# Patient Record
Sex: MALE | Race: WHITE | Employment: FULL TIME | URBAN - METROPOLITAN AREA
[De-identification: names, ages, dates, MRNs, and addresses within clinical notes are randomized per-mention and may not be internally consistent; named-entity substitution may affect disease eponyms.]

---

## 2021-10-08 ENCOUNTER — OFFICE VISIT (OUTPATIENT)
Dept: FAMILY MEDICINE CLINIC | Age: 36
End: 2021-10-08
Payer: COMMERCIAL

## 2021-10-08 VITALS
HEIGHT: 74 IN | BODY MASS INDEX: 27.08 KG/M2 | SYSTOLIC BLOOD PRESSURE: 115 MMHG | HEART RATE: 81 BPM | WEIGHT: 211 LBS | TEMPERATURE: 98.2 F | DIASTOLIC BLOOD PRESSURE: 79 MMHG | RESPIRATION RATE: 18 BRPM | OXYGEN SATURATION: 98 %

## 2021-10-08 DIAGNOSIS — G89.29 CHRONIC BILATERAL LOW BACK PAIN WITHOUT SCIATICA: ICD-10-CM

## 2021-10-08 DIAGNOSIS — M21.619 BUNION: ICD-10-CM

## 2021-10-08 DIAGNOSIS — K58.0 IRRITABLE BOWEL SYNDROME WITH DIARRHEA: ICD-10-CM

## 2021-10-08 DIAGNOSIS — F41.9 ANXIETY: ICD-10-CM

## 2021-10-08 DIAGNOSIS — M54.50 CHRONIC BILATERAL LOW BACK PAIN WITHOUT SCIATICA: ICD-10-CM

## 2021-10-08 DIAGNOSIS — R00.2 PALPITATION: ICD-10-CM

## 2021-10-08 DIAGNOSIS — R55 SYNCOPE, UNSPECIFIED SYNCOPE TYPE: ICD-10-CM

## 2021-10-08 DIAGNOSIS — I49.9 IRREGULAR HEART RATE: Primary | ICD-10-CM

## 2021-10-08 PROCEDURE — 93000 ELECTROCARDIOGRAM COMPLETE: CPT | Performed by: FAMILY MEDICINE

## 2021-10-08 PROCEDURE — 99204 OFFICE O/P NEW MOD 45 MIN: CPT | Performed by: FAMILY MEDICINE

## 2021-10-08 RX ORDER — DICYCLOMINE HYDROCHLORIDE 10 MG/1
10 CAPSULE ORAL
Qty: 90 CAPSULE | Refills: 1 | Status: SHIPPED | OUTPATIENT
Start: 2021-10-08 | End: 2022-03-28 | Stop reason: SDUPTHER

## 2021-10-08 RX ORDER — SERTRALINE HYDROCHLORIDE 50 MG/1
50 TABLET, FILM COATED ORAL DAILY
Qty: 90 TABLET | Refills: 1 | Status: SHIPPED | OUTPATIENT
Start: 2021-10-08 | End: 2022-04-15 | Stop reason: SDUPTHER

## 2021-10-08 RX ORDER — PANTOPRAZOLE SODIUM 40 MG/1
40 TABLET, DELAYED RELEASE ORAL DAILY
Qty: 90 TABLET | Refills: 1 | Status: SHIPPED | OUTPATIENT
Start: 2021-10-08 | End: 2022-04-15 | Stop reason: SDUPTHER

## 2021-10-08 NOTE — PROGRESS NOTES
NAVYA Clark. is a 43 y.o. male who presents as a new patient. Has a lot of stuff on his mind. He primarily made this plan because of palpitations. He had an episode a few weeks ago where his heart pounded a few times while he was seated and then when he started paying attention to it he felt like his heart skipped a beat. He felt no chest pain lightheadedness or dizziness during the episode but does think he felt exhausted for an hour or 2 afterward. This is not a typical occurrence for him. He did tell me that he has had episodes of dizziness unrelated to palpitations. Since he was a teenager he recalls randomly blacking out with standing. This persisted through his time in the [employer]. He will still have an episode about once a year where he will stand up too quick and blackout and fall over a with very little warning. He had some arm fatigue with doing overhead work years ago and sounds like they were pursuing a work-up for thoracic outlet syndrome or subclavian steal but he did not complete the work-up. He has low back pain that he seen a chiropractor for but is never done any physical therapy. This bothers him on a daily basis. Points bilateral low back to the paraspinal muscles. Occasionally feels something shoot down the right leg. She has anxiety that is affecting his work and home life. Cites life stressors, working through child custody, stress at work. He has trouble focusing at work, easily distracted, gets stuck in a bed headspace. .  Mood has been a longstanding issue. The only thing is ever taken for it is Ativan. Took that daily and felt like it was not very effective. For 1 week out of every month he will have cramping loose stool. This will typically happen after meals. He will get cramping and then have to run to the bathroom. He will have a loose bowel movement and the cramping will get better.   On days where this is happening he will typically have 34 bowel movements. The other weeks out of the month are usually fine. His mother is getting worked up for her own bowel issues and he remembers community-acquired C. difficile coming up as a possibility. He thinks she would have been tested for this by now and has not heard anything since. Foot pain. Right great toe bunion. Joint is painful to the point where he is limping by the end of the day. He would like to see a foot doctor about this            PMHx:  No past medical history on file. Meds:   Current Outpatient Medications   Medication Sig Dispense Refill    sertraline (ZOLOFT) 50 mg tablet Take 1 Tablet by mouth daily. 90 Tablet 1    dicyclomine (BENTYL) 10 mg capsule Take 1 Capsule by mouth Before breakfast, lunch, and dinner. 90 Capsule 1       Allergies: Allergies   Allergen Reactions    Amoxicillin Unknown (comments)    Codeine Unknown (comments)       Smoker:  Social History     Tobacco Use   Smoking Status Former Smoker    Packs/day: 1.00    Years: 15.00    Pack years: 15.00    Types: Cigarettes    Quit date: 10/15/2019    Years since quittin.9   Smokeless Tobacco Former User    Types: [employer] Quit date: 10/7/2021       ETOH:   Social History     Substance and Sexual Activity   Alcohol Use Yes    Alcohol/week: 20.0 standard drinks    Types: 20 Cans of beer per week       FH: No family history on file. ROS:   As listed in HPI. In addition:  Constitutional:   No headache, fever, fatigue, weight loss or weight gain      Cardiac:    No chest pain      Resp:   No cough or shortness of breath      Neuro   No loss of consciousness, dizziness, seizures      Physical Exam:  Blood pressure 115/79, pulse 81, temperature 98.2 °F (36.8 °C), temperature source Skin, resp. rate 18, height 6' 2\" (1.88 m), weight 211 lb (95.7 kg), SpO2 98 %. GEN: No apparent distress. Alert and oriented and responds to all questions appropriately. NEUROLOGIC:  No focal neurologic deficits. Strength and sensation grossly intact. Coordination and gait grossly intact. EXT: Well perfused. No edema. SKIN: No obvious rashes. Lungs clear to auscultation bilaterally  CV regular rhythm no murmur  Low back examined. No midline tenderness, paraspinal muscle tenderness bilaterally. No postural muscle tenderness. Right great toe bunion. Pain is from the actual joint rather than the bunion suggesting arthritis. EKG normal sinus rhythm       Assessment and Plan     Palpitation  One-time episode and has benign features. Reassured him about that    Syncope  Longstanding issue since he was a teenager. Appears to have never grown out of it? Could be vagal syncope but I think it would be reasonable to get a cardiologist's opinion. Is it is happening without a pattern that he can detect, only once a year at this point. Back pain  Physical therapy  Referred to orthopedist at his request.    Painful bunion  Daily problem, causing him to limp. Discussed conservative measures like toe splints but it is probable that this will not be effective at this stage. Suggest that he see Ortho foot. Anxiety  Start Zoloft 50 mg, take 25 mg for the first 3 days. Follow-up 1 month 1 dose ineffectiveness. IBS  Could be a mood related issue or primary IBS  See if Bentyl provides relief    Reflux  Pepto and [place] partially effective. Try PPI      Follow-up 1 month on Zoloft dose and effectiveness    This was a 45-minute visit. Greater than 50% of the time was spent counseling the patient on palpitations, syncope, blindness, back pain, anxiety. TSH a little suppressed. Not obvious that this would cause symptoms but needs follow-up based on his issues. Follow-up with repeat TSH T4 T3        ICD-10-CM ICD-9-CM    1.  Irregular heart rate  I49.9 427.9 AMB POC EKG ROUTINE W/ 12 LEADS, INTER & REP   2. Palpitation  R00.2 785.1 REFERRAL TO CARDIOLOGY      LIPID PANEL      CBC WITH AUTOMATED DIFF METABOLIC PANEL, COMPREHENSIVE      TSH 3RD GENERATION   3. Syncope, unspecified syncope type  R55 780.2 REFERRAL TO CARDIOLOGY   4. Bunion  M21.619 727.1 REFERRAL TO ORTHOPEDICS   5. Chronic bilateral low back pain without sciatica  M54.50 724.2 REFERRAL TO ORTHOPEDICS    G89.29 338.29    6. Anxiety  F41.9 300.00 sertraline (ZOLOFT) 50 mg tablet   7. Irritable bowel syndrome with diarrhea  K58.0 564.1 dicyclomine (BENTYL) 10 mg capsule       AVS given.  Pt expressed understanding of instructions

## 2021-10-08 NOTE — PROGRESS NOTES
Singh Herring. is a 43 y.o. male  Chief Complaint   Patient presents with    Irregular Heart Beat    Abdominal Pain    Anxiety     1. Have you been to the ER, urgent care clinic since your last visit? Hospitalized since your last visit?no    2. Have you seen or consulted any other health care providers outside of the [address] [place] [address] since your last visit? Include any pap smears or colon screening.  No  Health Maintenance   Topic Date Due    Hepatitis C Screening  Never done    Pneumococcal 0-64 years (1 of 2 - PPSV23) Never done    COVID-19 Vaccine (1) Never done    DTaP/Tdap/Td series (1 - Tdap) Never done    Flu Vaccine (1) Never done     Visit Vitals  /79 (BP 1 Location: Right arm, BP Patient Position: At rest, BP Cuff Size: Large adult)   Pulse 81   Temp 98.2 °F (36.8 °C) (Skin)   Resp 18   Ht 6' 2\" (1.88 m)   Wt 211 lb (95.7 kg)   SpO2 98%   BMI 27.09 kg/m²

## 2021-10-09 LAB
ALBUMIN SERPL-MCNC: 4.6 G/DL (ref 4–5)
ALBUMIN/GLOB SERPL: 1.8 {RATIO} (ref 1.2–2.2)
ALP SERPL-CCNC: 46 IU/L (ref 44–121)
ALT SERPL-CCNC: 12 IU/L (ref 0–44)
AST SERPL-CCNC: 18 IU/L (ref 0–40)
BASOPHILS # BLD AUTO: 0.1 X10E3/UL (ref 0–0.2)
BASOPHILS NFR BLD AUTO: 2 %
BILIRUB SERPL-MCNC: 0.4 MG/DL (ref 0–1.2)
BUN SERPL-MCNC: 10 MG/DL (ref 6–20)
BUN/CREAT SERPL: 10 (ref 9–20)
CALCIUM SERPL-MCNC: 9.3 MG/DL (ref 8.7–10.2)
CHLORIDE SERPL-SCNC: 103 MMOL/L (ref 96–106)
CHOLEST SERPL-MCNC: 175 MG/DL (ref 100–199)
CO2 SERPL-SCNC: 24 MMOL/L (ref 20–29)
CREAT SERPL-MCNC: 1.02 MG/DL (ref 0.76–1.27)
EOSINOPHIL # BLD AUTO: 0.1 X10E3/UL (ref 0–0.4)
EOSINOPHIL NFR BLD AUTO: 2 %
ERYTHROCYTE [DISTWIDTH] IN BLOOD BY AUTOMATED COUNT: 12.1 % (ref 11.6–15.4)
GLOBULIN SER CALC-MCNC: 2.5 G/DL (ref 1.5–4.5)
GLUCOSE SERPL-MCNC: 85 MG/DL (ref 65–99)
HCT VFR BLD AUTO: 42.4 % (ref 37.5–51)
HDLC SERPL-MCNC: 48 MG/DL
HGB BLD-MCNC: 14.3 G/DL (ref 13–17.7)
IMM GRANULOCYTES # BLD AUTO: 0 X10E3/UL (ref 0–0.1)
IMM GRANULOCYTES NFR BLD AUTO: 0 %
IMP & REVIEW OF LAB RESULTS: NORMAL
LDLC SERPL CALC-MCNC: 118 MG/DL (ref 0–99)
LYMPHOCYTES # BLD AUTO: 1.8 X10E3/UL (ref 0.7–3.1)
LYMPHOCYTES NFR BLD AUTO: 31 %
MCH RBC QN AUTO: 30.4 PG (ref 26.6–33)
MCHC RBC AUTO-ENTMCNC: 33.7 G/DL (ref 31.5–35.7)
MCV RBC AUTO: 90 FL (ref 79–97)
MONOCYTES # BLD AUTO: 0.4 X10E3/UL (ref 0.1–0.9)
MONOCYTES NFR BLD AUTO: 6 %
NEUTROPHILS # BLD AUTO: 3.5 X10E3/UL (ref 1.4–7)
NEUTROPHILS NFR BLD AUTO: 59 %
PLATELET # BLD AUTO: 251 X10E3/UL (ref 150–450)
POTASSIUM SERPL-SCNC: 4.6 MMOL/L (ref 3.5–5.2)
PROT SERPL-MCNC: 7.1 G/DL (ref 6–8.5)
RBC # BLD AUTO: 4.71 X10E6/UL (ref 4.14–5.8)
SODIUM SERPL-SCNC: 139 MMOL/L (ref 134–144)
TRIGL SERPL-MCNC: 45 MG/DL (ref 0–149)
TSH SERPL DL<=0.005 MIU/L-ACNC: 0.45 UIU/ML (ref 0.45–4.5)
VLDLC SERPL CALC-MCNC: 9 MG/DL (ref 5–40)
WBC # BLD AUTO: 5.9 X10E3/UL (ref 3.4–10.8)

## 2021-10-11 ENCOUNTER — TELEPHONE (OUTPATIENT)
Dept: FAMILY MEDICINE CLINIC | Age: 36
End: 2021-10-11

## 2021-10-11 NOTE — TELEPHONE ENCOUNTER
Labs reviewed. Your thyroid level is a little off.   Does not look like this would cause you any symptoms but we should follow up with more blood work when I see you in a month to follow-up on your medication

## 2021-11-08 ENCOUNTER — OFFICE VISIT (OUTPATIENT)
Dept: FAMILY MEDICINE CLINIC | Age: 36
End: 2021-11-08
Payer: COMMERCIAL

## 2021-11-08 VITALS
HEART RATE: 75 BPM | TEMPERATURE: 97.7 F | WEIGHT: 208.4 LBS | BODY MASS INDEX: 26.75 KG/M2 | OXYGEN SATURATION: 99 % | HEIGHT: 74 IN | SYSTOLIC BLOOD PRESSURE: 131 MMHG | RESPIRATION RATE: 16 BRPM | DIASTOLIC BLOOD PRESSURE: 88 MMHG

## 2021-11-08 DIAGNOSIS — R55 SYNCOPE, UNSPECIFIED SYNCOPE TYPE: ICD-10-CM

## 2021-11-08 DIAGNOSIS — R79.89 ABNORMAL TSH: ICD-10-CM

## 2021-11-08 DIAGNOSIS — R00.2 PALPITATION: ICD-10-CM

## 2021-11-08 DIAGNOSIS — F41.9 ANXIETY: Primary | ICD-10-CM

## 2021-11-08 PROCEDURE — 99214 OFFICE O/P EST MOD 30 MIN: CPT | Performed by: FAMILY MEDICINE

## 2021-11-08 NOTE — PROGRESS NOTES
1. Have you been to the ER, urgent care clinic since your last visit? Hospitalized since your last visit? No    2. Have you seen or consulted any other health care providers outside of the [address] [place] [address] since your last visit? Include any pap smears or colon screening. No    Health Maintenance Due   Topic Date Due    Hepatitis C Screening  Never done    COVID-19 Vaccine (1) Never done    DTaP/Tdap/Td series (1 - Tdap) Never done    Flu Vaccine (1) Never done     Pt refuses flu vaccine.

## 2021-11-08 NOTE — PROGRESS NOTES
NAVYA Royal. is a 43 y.o. male who presents to follow-up on Zoloft dose ineffectiveness, intermittent abdominal cramping, reflux symptoms and an abnormal TSH. Feels good since starting Zoloft, Bentyl, Protonix. He feels like he is calmer and less depressed. He has no current mood complaints and would like to continue his current dose of Zoloft. Recall that he establish care a month ago. Had a lot on his mind    At the time his primary concern is palpitations. He had an episode a few weeks ago where his heart pounded a few times while he was seated and then when he started paying attention to it he felt like his heart skipped a beat. He felt no chest pain lightheadedness or dizziness during the episode but does think he felt exhausted for an hour or 2 afterward. This is not a typical occurrence for him. He did tell me that he has had episodes of dizziness unrelated to palpitations. Since he was a teenager he recalls randomly blacking out with standing. This persisted through his time in the [employer]. He will still have an episode about once a year where he will stand up too quick and blackout and fall over a with very little warning. He had some arm fatigue with doing overhead work years ago and sounds like they were pursuing a work-up for thoracic outlet syndrome or subclavian steal but he did not complete the work-up. He has low back pain that he seen a chiropractor for but is never done any physical therapy. This bothers him on a daily basis. Points bilateral low back to the paraspinal muscles. Occasionally feels something shoot down the right leg. She has anxiety that is affecting his work and home life. Cites life stressors, working through child custody, stress at work. He has trouble focusing at work, easily distracted, gets stuck in a bed headspace. .  Mood has been a longstanding issue. The only thing is ever taken for it is Ativan.   Took that daily and felt like it was not very effective. For 1 week out of every month he will have cramping loose stool. This will typically happen after meals. He will get cramping and then have to run to the bathroom. He will have a loose bowel movement and the cramping will get better. On days where this is happening he will typically have 34 bowel movements. The other weeks out of the month are usually fine. His mother is getting worked up for her own bowel issues and he remembers community-acquired C. difficile coming up as a possibility. He thinks she would have been tested for this by now and has not heard anything since. Foot pain. Right great toe bunion. Joint is painful to the point where he is limping by the end of the day. He would like to see a foot doctor about this    PMHx:  History reviewed. No pertinent past medical history. Meds:   Current Outpatient Medications   Medication Sig Dispense Refill    sertraline (ZOLOFT) 50 mg tablet Take 1 Tablet by mouth daily. 90 Tablet 1    dicyclomine (BENTYL) 10 mg capsule Take 1 Capsule by mouth Before breakfast, lunch, and dinner. 90 Capsule 1    pantoprazole (PROTONIX) 40 mg tablet Take 1 Tablet by mouth daily. 90 Tablet 1       Allergies: Allergies   Allergen Reactions    Amoxicillin Unknown (comments)    Codeine Unknown (comments)       Smoker:  Social History     Tobacco Use   Smoking Status Former Smoker    Packs/day: 1.00    Years: 15.00    Pack years: 15.00    Types: Cigarettes    Quit date: 10/15/2019    Years since quittin.0   Smokeless Tobacco Former User    Types: [employer] Quit date: 10/7/2021       ETOH:   Social History     Substance and Sexual Activity   Alcohol Use Yes    Alcohol/week: 20.0 standard drinks    Types: 20 Cans of beer per week       FH: History reviewed. No pertinent family history. ROS:   As listed in HPI.  In addition:  Constitutional:   No headache, fever, fatigue, weight loss or weight gain      Cardiac:    No chest pain      Resp:   No cough or shortness of breath      Neuro   No loss of consciousness, dizziness, seizures      Physical Exam:  Blood pressure 131/88, pulse 75, temperature 97.7 °F (36.5 °C), temperature source Temporal, resp. rate 16, height 6' 2\" (1.88 m), weight 208 lb 6.4 oz (94.5 kg), SpO2 99 %. GEN: No apparent distress. Alert and oriented and responds to all questions appropriately. NEUROLOGIC:  No focal neurologic deficits. Strength and sensation grossly intact. Coordination and gait grossly intact. EXT: Well perfused. No edema. SKIN: No obvious rashes. Lungs clear to auscultation bilaterally  CV regular rhythm no murmur         Assessment and Plan     Abnormal TSH  Just barely out of range but given his complaints I would like to follow this up with T3-T4 today      Anxiety  Feels like this is well controlled Zoloft 50 mg. Has been on this for 4 weeks at this point. Follow-up as needed or 6 months      IBS  Could be a mood related issue or primary IBS  Since starting Zoloft and Bentyl his frequency went from once a week to once a month. Monitor, he is only taking Bentyl once at night. Advised that this could be a \"as needed\" medicine    Reflux  Getting significant relief from Protonix. Take this for 1-3 months and see if you can stop    Syncope  Longstanding issue since he was a teenager. Appears to have never grown out of it? Could be vagal syncope but I think it would be reasonable to get a cardiologist's opinion. Is it is happening without a pattern that he can detect, only once a year at this point. Back pain  Physical therapy  Referred to orthopedist at his request.    Painful bunion  Daily problem, causing him to limp. Discussed conservative measures like toe splints but it is probable that this will not be effective at this stage. Suggest that he see Ortho foot. ICD-10-CM ICD-9-CM    1. Anxiety  F41.9 300.00    2.  Abnormal TSH  R79.89 790.6 TSH 3RD GENERATION T4, FREE      T3 TOTAL      TSH 3RD GENERATION      T4, FREE      T3 TOTAL   3. Palpitation  R00.2 785.1 TSH 3RD GENERATION      T4, FREE      T3 TOTAL      TSH 3RD GENERATION      T4, FREE      T3 TOTAL   4. Syncope, unspecified syncope type  R55 780.2        AVS given.  Pt expressed understanding of instructions

## 2021-11-09 LAB
T3 SERPL-MCNC: 90 NG/DL (ref 71–180)
T4 FREE SERPL-MCNC: 0.97 NG/DL (ref 0.82–1.77)
TSH SERPL DL<=0.005 MIU/L-ACNC: 0.68 UIU/ML (ref 0.45–4.5)

## 2022-03-28 ENCOUNTER — OFFICE VISIT (OUTPATIENT)
Dept: FAMILY MEDICINE CLINIC | Age: 37
End: 2022-03-28
Payer: COMMERCIAL

## 2022-03-28 VITALS
WEIGHT: 205 LBS | RESPIRATION RATE: 18 BRPM | DIASTOLIC BLOOD PRESSURE: 84 MMHG | HEART RATE: 91 BPM | OXYGEN SATURATION: 97 % | BODY MASS INDEX: 26.31 KG/M2 | HEIGHT: 74 IN | SYSTOLIC BLOOD PRESSURE: 121 MMHG | TEMPERATURE: 98 F

## 2022-03-28 DIAGNOSIS — R10.13 EPIGASTRIC PAIN: ICD-10-CM

## 2022-03-28 DIAGNOSIS — K58.0 IRRITABLE BOWEL SYNDROME WITH DIARRHEA: Primary | ICD-10-CM

## 2022-03-28 PROCEDURE — 99214 OFFICE O/P EST MOD 30 MIN: CPT | Performed by: FAMILY MEDICINE

## 2022-03-28 RX ORDER — DICYCLOMINE HYDROCHLORIDE 10 MG/1
10 CAPSULE ORAL 4 TIMES DAILY
Qty: 120 CAPSULE | Refills: 3 | Status: SHIPPED | OUTPATIENT
Start: 2022-03-28 | End: 2022-04-15 | Stop reason: SDUPTHER

## 2022-03-28 NOTE — PROGRESS NOTES
Delroy Lu. is a 42 y.o. male  Chief Complaint   Patient presents with    Follow-up    Abdominal Pain     1. Have you been to the ER, urgent care clinic since your last visit? Hospitalized since your last visit?no    2. Have you seen or consulted any other health care providers outside of the [address] [place] [address] since your last visit? Include any pap smears or colon screening.  No  Health Maintenance   Topic Date Due    Hepatitis C Screening  Never done    COVID-19 Vaccine (1) Never done    DTaP/Tdap/Td series (1 - Tdap) Never done    Flu Vaccine (1) Never done    Depression Screen  11/08/2022    Pneumococcal 0-64 years  Aged Out     Visit Vitals  /84 (BP 1 Location: Left upper arm, BP Patient Position: At rest, BP Cuff Size: Large adult)   Pulse 91   Temp 98 °F (36.7 °C) (Skin)   Resp 18   Ht 6' 2\" (1.88 m)   Wt 205 lb (93 kg)   SpO2 97%   BMI 26.32 kg/m²

## 2022-03-28 NOTE — PROGRESS NOTES
HPI  Lindsey Galarza. is a 42 y.o. male who presents to follow-up on Zoloft dose effectiveness, intermittent abdominal cramping, reflux symptoms     Having a rough day today. Was at a shrimp boil at his in-laws house last night. Had a normal amount of food. Does not feel like you overindulged. He did have 2 IPAs. Was not unusually spicy. A few hours after the meal on his way home had intense epigastric pain, nausea, vomiting. This went on for several hours. Had a lot of pressure building up in the epigastrium was only relieved by vomiting. This does happen intermittently. It could happen after dinner or lunch. Happening about once a week for the last several months. He has been prescribed Bentyl and Protonix. As far as he knows he is taking the Protonix every day. He is taking the Bentyl about once a day in the evening which when we first started it worked well for him in response to symptoms that sound like IBS:     Prior to starting Bentyl  For 1 week out of every month he will have cramping loose stool. This will typically happen after meals. He will get cramping and then have to run to the bathroom. He will have a loose bowel movement and the cramping will get better. On days where this is happening he will typically have 34 bowel movements. The other weeks out of the month are usually fine. His mother is getting worked up for her own bowel issues and he remembers community-acquired C. difficile coming up as a possibility. He thinks she would have been tested for this by now and has not heard anything since. PMHx:  No past medical history on file. Meds:   Current Outpatient Medications   Medication Sig Dispense Refill    dicyclomine (BENTYL) 10 mg capsule Take 1 Capsule by mouth four (4) times daily. 120 Capsule 3    sertraline (ZOLOFT) 50 mg tablet Take 1 Tablet by mouth daily. 90 Tablet 1    pantoprazole (PROTONIX) 40 mg tablet Take 1 Tablet by mouth daily.  90 Tablet 1       Allergies: Allergies   Allergen Reactions    Amoxicillin Unknown (comments)    Codeine Unknown (comments)       Smoker:  Social History     Tobacco Use   Smoking Status Former Smoker    Packs/day: 1.00    Years: 15.00    Pack years: 15.00    Types: Cigarettes    Quit date: 10/15/2019    Years since quittin.4   Smokeless Tobacco Former User    Types: [place] Quit date: 10/7/2021       ETOH:   Social History     Substance and Sexual Activity   Alcohol Use Yes    Alcohol/week: 20.0 standard drinks    Types: 20 Cans of beer per week       FH: No family history on file. ROS:   As listed in HPI. In addition:  Constitutional:   No headache, fever, fatigue, weight loss or weight gain      Cardiac:    No chest pain      Resp:   No cough or shortness of breath      Neuro   No loss of consciousness, dizziness, seizures      Physical Exam:  Blood pressure 121/84, pulse 91, temperature 98 °F (36.7 °C), temperature source Skin, resp. rate 18, height 6' 2\" (1.88 m), weight 205 lb (93 kg), SpO2 97 %. GEN: No apparent distress. Alert and oriented and responds to all questions appropriately. NEUROLOGIC:  No focal neurologic deficits. Strength and sensation grossly intact. Coordination and gait grossly intact. EXT: Well perfused. No edema. SKIN: No obvious rashes. Lungs clear to auscultation bilaterally  CV regular rhythm no murmur         Assessment and Plan     Abdominal pain, vomiting  Reflux versus an IBS symptoms versus gallbladder versus pancreas  Lipase  CMP  Ultrasound  Make appointment to see GI, has not done this yet  Make sure you are taking your Protonix. Use a pill organizer if necessary  Increase Bentyl to 4 times daily to see if that makes any difference    Anxiety  Feels like this is well controlled Zoloft 50 mg. Has been on this for 4 weeks at this point. Follow-up as needed or 6 months            ICD-10-CM ICD-9-CM    1.  Irritable bowel syndrome with diarrhea  K58.0 564.1 dicyclomine (BENTYL) 10 mg capsule   2. Epigastric pain  R10.13 789.06 CBC WITH AUTOMATED DIFF      METABOLIC PANEL, COMPREHENSIVE      LIPASE      CBC WITH AUTOMATED DIFF      METABOLIC PANEL, COMPREHENSIVE      LIPASE      US ABD LTD       AVS given.  Pt expressed understanding of instructions

## 2022-03-29 LAB
ALBUMIN SERPL-MCNC: 4.7 G/DL (ref 4–5)
ALBUMIN/GLOB SERPL: 1.8 {RATIO} (ref 1.2–2.2)
ALP SERPL-CCNC: 57 IU/L (ref 44–121)
ALT SERPL-CCNC: 15 IU/L (ref 0–44)
AST SERPL-CCNC: 18 IU/L (ref 0–40)
BASOPHILS # BLD AUTO: 0.1 X10E3/UL (ref 0–0.2)
BASOPHILS NFR BLD AUTO: 2 %
BILIRUB SERPL-MCNC: 0.5 MG/DL (ref 0–1.2)
BUN SERPL-MCNC: 9 MG/DL (ref 6–20)
BUN/CREAT SERPL: 9 (ref 9–20)
CALCIUM SERPL-MCNC: 9.7 MG/DL (ref 8.7–10.2)
CHLORIDE SERPL-SCNC: 103 MMOL/L (ref 96–106)
CO2 SERPL-SCNC: 24 MMOL/L (ref 20–29)
CREAT SERPL-MCNC: 0.99 MG/DL (ref 0.76–1.27)
EGFR: 99 ML/MIN/1.73
EOSINOPHIL # BLD AUTO: 0.1 X10E3/UL (ref 0–0.4)
EOSINOPHIL NFR BLD AUTO: 2 %
ERYTHROCYTE [DISTWIDTH] IN BLOOD BY AUTOMATED COUNT: 12.3 % (ref 11.6–15.4)
GLOBULIN SER CALC-MCNC: 2.6 G/DL (ref 1.5–4.5)
GLUCOSE SERPL-MCNC: 94 MG/DL (ref 65–99)
HCT VFR BLD AUTO: 43.3 % (ref 37.5–51)
HGB BLD-MCNC: 14.5 G/DL (ref 13–17.7)
IMM GRANULOCYTES # BLD AUTO: 0 X10E3/UL (ref 0–0.1)
IMM GRANULOCYTES NFR BLD AUTO: 0 %
LIPASE SERPL-CCNC: 29 U/L (ref 13–78)
LYMPHOCYTES # BLD AUTO: 2.1 X10E3/UL (ref 0.7–3.1)
LYMPHOCYTES NFR BLD AUTO: 35 %
MCH RBC QN AUTO: 30.1 PG (ref 26.6–33)
MCHC RBC AUTO-ENTMCNC: 33.5 G/DL (ref 31.5–35.7)
MCV RBC AUTO: 90 FL (ref 79–97)
MONOCYTES # BLD AUTO: 0.4 X10E3/UL (ref 0.1–0.9)
MONOCYTES NFR BLD AUTO: 6 %
NEUTROPHILS # BLD AUTO: 3.2 X10E3/UL (ref 1.4–7)
NEUTROPHILS NFR BLD AUTO: 55 %
PLATELET # BLD AUTO: 266 X10E3/UL (ref 150–450)
POTASSIUM SERPL-SCNC: 4.5 MMOL/L (ref 3.5–5.2)
PROT SERPL-MCNC: 7.3 G/DL (ref 6–8.5)
RBC # BLD AUTO: 4.82 X10E6/UL (ref 4.14–5.8)
SODIUM SERPL-SCNC: 139 MMOL/L (ref 134–144)
WBC # BLD AUTO: 5.9 X10E3/UL (ref 3.4–10.8)

## 2022-04-04 ENCOUNTER — HOSPITAL ENCOUNTER (OUTPATIENT)
Dept: ULTRASOUND IMAGING | Age: 37
Discharge: HOME | End: 2022-04-04
Attending: FAMILY MEDICINE
Payer: COMMERCIAL

## 2022-04-04 DIAGNOSIS — R10.13 EPIGASTRIC PAIN: ICD-10-CM

## 2022-04-04 PROCEDURE — 76705 ECHO EXAM OF ABDOMEN: CPT

## 2022-04-15 DIAGNOSIS — K58.0 IRRITABLE BOWEL SYNDROME WITH DIARRHEA: ICD-10-CM

## 2022-04-15 DIAGNOSIS — F41.9 ANXIETY: ICD-10-CM

## 2022-04-15 RX ORDER — DICYCLOMINE HYDROCHLORIDE 10 MG/1
10 CAPSULE ORAL 4 TIMES DAILY
Qty: 120 CAPSULE | Refills: 3 | Status: SHIPPED | OUTPATIENT
Start: 2022-04-15

## 2022-04-15 RX ORDER — SERTRALINE HYDROCHLORIDE 50 MG/1
50 TABLET, FILM COATED ORAL DAILY
Qty: 90 TABLET | Refills: 1 | Status: SHIPPED | OUTPATIENT
Start: 2022-04-15

## 2022-04-15 RX ORDER — PANTOPRAZOLE SODIUM 40 MG/1
40 TABLET, DELAYED RELEASE ORAL DAILY
Qty: 90 TABLET | Refills: 1 | Status: SHIPPED | OUTPATIENT
Start: 2022-04-15

## 2022-04-15 NOTE — TELEPHONE ENCOUNTER
Refill request ([facility] sent fax)     Requested Prescriptions     Pending Prescriptions Disp Refills    pantoprazole (PROTONIX) 40 mg tablet 90 Tablet 1     Sig: Take 1 Tablet by mouth daily.  dicyclomine (BENTYL) 10 mg capsule 120 Capsule 3     Sig: Take 1 Capsule by mouth four (4) times daily.  sertraline (ZOLOFT) 50 mg tablet 90 Tablet 1     Sig: Take 1 Tablet by mouth daily.        Thank you

## 2022-06-14 ENCOUNTER — TELEPHONE (OUTPATIENT)
Dept: FAMILY MEDICINE CLINIC | Age: 37
End: 2022-06-14

## 2022-06-14 DIAGNOSIS — K58.0 IRRITABLE BOWEL SYNDROME WITH DIARRHEA: Primary | ICD-10-CM

## 2022-06-14 NOTE — TELEPHONE ENCOUNTER
----- Message from Aniyah Francisco sent at 6/14/2022 10:52 AM EDT -----  Subject: Referral Request    QUESTIONS   Reason for referral request? Patient is requesting a referral for a GI   doctor. Has the physician seen you for this condition before? Yes  Select a date? 2022-03-28  Select the Provider the patient wants to be referred to, if known (PCP or   Specialist)? Saray Melara   Preferred Specialist (if applicable)? Do you already have an appointment scheduled? No  Additional Information for Provider?   ---------------------------------------------------------------------------  --------------  CALL BACK INFO  What is the best way for the office to contact you? OK to leave message on   voicemail  Preferred Call Back Phone Number? [phone]  ---------------------------------------------------------------------------  --------------  SCRIPT ANSWERS  Relationship to Patient? Other  Representative Name? [employer]  Is the Representative on the appropriate HIPAA document in Epic?  Yes

## 2022-07-08 ENCOUNTER — APPOINTMENT (OUTPATIENT)
Dept: ULTRASOUND IMAGING | Age: 37
End: 2022-07-08
Attending: EMERGENCY MEDICINE
Payer: COMMERCIAL

## 2022-07-08 ENCOUNTER — APPOINTMENT (OUTPATIENT)
Dept: CT IMAGING | Age: 37
End: 2022-07-08
Attending: EMERGENCY MEDICINE
Payer: COMMERCIAL

## 2022-07-08 ENCOUNTER — HOSPITAL ENCOUNTER (EMERGENCY)
Age: 37
Discharge: HOME | End: 2022-07-08
Attending: EMERGENCY MEDICINE
Payer: COMMERCIAL

## 2022-07-08 VITALS
RESPIRATION RATE: 14 BRPM | HEART RATE: 65 BPM | BODY MASS INDEX: 24.25 KG/M2 | DIASTOLIC BLOOD PRESSURE: 67 MMHG | WEIGHT: 195 LBS | HEIGHT: 75 IN | SYSTOLIC BLOOD PRESSURE: 106 MMHG | TEMPERATURE: 97.8 F | OXYGEN SATURATION: 100 %

## 2022-07-08 DIAGNOSIS — K52.9 NONINFECTIOUS GASTROENTERITIS, UNSPECIFIED TYPE: Primary | ICD-10-CM

## 2022-07-08 LAB
ALBUMIN SERPL-MCNC: 4.2 G/DL (ref 3.5–5)
ALBUMIN/GLOB SERPL: 1.2 {RATIO} (ref 1.1–2.2)
ALP SERPL-CCNC: 53 U/L (ref 45–117)
ALT SERPL-CCNC: 24 U/L (ref 12–78)
ANION GAP SERPL CALC-SCNC: 7 MMOL/L (ref 5–15)
AST SERPL-CCNC: 23 U/L (ref 15–37)
BASOPHILS # BLD: 0.1 K/UL (ref 0–0.1)
BASOPHILS NFR BLD: 0 % (ref 0–1)
BILIRUB SERPL-MCNC: 1.1 MG/DL (ref 0.2–1)
BUN SERPL-MCNC: 16 MG/DL (ref 6–20)
BUN/CREAT SERPL: 17 (ref 12–20)
CALCIUM SERPL-MCNC: 9.6 MG/DL (ref 8.5–10.1)
CHLORIDE SERPL-SCNC: 110 MMOL/L (ref 97–108)
CO2 SERPL-SCNC: 24 MMOL/L (ref 21–32)
CREAT SERPL-MCNC: 0.96 MG/DL (ref 0.7–1.3)
DIFFERENTIAL METHOD BLD: ABNORMAL
EOSINOPHIL # BLD: 0 K/UL (ref 0–0.4)
EOSINOPHIL NFR BLD: 0 % (ref 0–7)
ERYTHROCYTE [DISTWIDTH] IN BLOOD BY AUTOMATED COUNT: 12.6 % (ref 11.5–14.5)
GLOBULIN SER CALC-MCNC: 3.4 G/DL (ref 2–4)
GLUCOSE SERPL-MCNC: 151 MG/DL (ref 65–100)
HCT VFR BLD AUTO: 37.6 % (ref 36.6–50.3)
HGB BLD-MCNC: 13 G/DL (ref 12.1–17)
IMM GRANULOCYTES # BLD AUTO: 0.1 K/UL (ref 0–0.04)
IMM GRANULOCYTES NFR BLD AUTO: 1 % (ref 0–0.5)
LIPASE SERPL-CCNC: 110 U/L (ref 73–393)
LYMPHOCYTES # BLD: 1 K/UL (ref 0.8–3.5)
LYMPHOCYTES NFR BLD: 7 % (ref 12–49)
MCH RBC QN AUTO: 30.8 PG (ref 26–34)
MCHC RBC AUTO-ENTMCNC: 34.6 G/DL (ref 30–36.5)
MCV RBC AUTO: 89.1 FL (ref 80–99)
MONOCYTES # BLD: 0.6 K/UL (ref 0–1)
MONOCYTES NFR BLD: 4 % (ref 5–13)
NEUTS SEG # BLD: 12.4 K/UL (ref 1.8–8)
NEUTS SEG NFR BLD: 88 % (ref 32–75)
NRBC # BLD: 0 K/UL (ref 0–0.01)
NRBC BLD-RTO: 0 PER 100 WBC
PLATELET # BLD AUTO: 237 K/UL (ref 150–400)
PMV BLD AUTO: 9.9 FL (ref 8.9–12.9)
POTASSIUM SERPL-SCNC: 3.8 MMOL/L (ref 3.5–5.1)
PROT SERPL-MCNC: 7.6 G/DL (ref 6.4–8.2)
RBC # BLD AUTO: 4.22 M/UL (ref 4.1–5.7)
SODIUM SERPL-SCNC: 141 MMOL/L (ref 136–145)
TROPONIN-HIGH SENSITIVITY: 4 NG/L (ref 0–76)
WBC # BLD AUTO: 14.2 K/UL (ref 4.1–11.1)

## 2022-07-08 PROCEDURE — 74011000250 HC RX REV CODE- 250: Performed by: EMERGENCY MEDICINE

## 2022-07-08 PROCEDURE — 74011000636 HC RX REV CODE- 636: Performed by: EMERGENCY MEDICINE

## 2022-07-08 PROCEDURE — 80053 COMPREHEN METABOLIC PANEL: CPT

## 2022-07-08 PROCEDURE — 96374 THER/PROPH/DIAG INJ IV PUSH: CPT

## 2022-07-08 PROCEDURE — 99285 EMERGENCY DEPT VISIT HI MDM: CPT

## 2022-07-08 PROCEDURE — 85025 COMPLETE CBC W/AUTO DIFF WBC: CPT

## 2022-07-08 PROCEDURE — 76705 ECHO EXAM OF ABDOMEN: CPT

## 2022-07-08 PROCEDURE — 96375 TX/PRO/DX INJ NEW DRUG ADDON: CPT

## 2022-07-08 PROCEDURE — 36415 COLL VENOUS BLD VENIPUNCTURE: CPT

## 2022-07-08 PROCEDURE — 84484 ASSAY OF TROPONIN QUANT: CPT

## 2022-07-08 PROCEDURE — 83690 ASSAY OF LIPASE: CPT

## 2022-07-08 PROCEDURE — 74177 CT ABD & PELVIS W/CONTRAST: CPT

## 2022-07-08 PROCEDURE — 93005 ELECTROCARDIOGRAM TRACING: CPT

## 2022-07-08 PROCEDURE — 74011250636 HC RX REV CODE- 250/636: Performed by: EMERGENCY MEDICINE

## 2022-07-08 PROCEDURE — 74011250637 HC RX REV CODE- 250/637: Performed by: EMERGENCY MEDICINE

## 2022-07-08 RX ORDER — ONDANSETRON 4 MG/1
4 TABLET, FILM COATED ORAL
Qty: 15 TABLET | Refills: 0 | Status: SHIPPED | OUTPATIENT
Start: 2022-07-08

## 2022-07-08 RX ORDER — PROCHLORPERAZINE EDISYLATE 5 MG/ML
10 INJECTION INTRAMUSCULAR; INTRAVENOUS
Status: DISCONTINUED | OUTPATIENT
Start: 2022-07-08 | End: 2022-07-09 | Stop reason: HOSPADM

## 2022-07-08 RX ORDER — PANTOPRAZOLE SODIUM 40 MG/1
40 TABLET, DELAYED RELEASE ORAL DAILY
Qty: 20 TABLET | Refills: 0 | Status: SHIPPED | OUTPATIENT
Start: 2022-07-08 | End: 2022-07-28

## 2022-07-08 RX ORDER — FENTANYL CITRATE 50 UG/ML
100 INJECTION, SOLUTION INTRAMUSCULAR; INTRAVENOUS ONCE
Status: COMPLETED | OUTPATIENT
Start: 2022-07-08 | End: 2022-07-08

## 2022-07-08 RX ORDER — ONDANSETRON 2 MG/ML
4 INJECTION INTRAMUSCULAR; INTRAVENOUS
Status: COMPLETED | OUTPATIENT
Start: 2022-07-08 | End: 2022-07-08

## 2022-07-08 RX ORDER — DICYCLOMINE HYDROCHLORIDE 20 MG/1
20 TABLET ORAL EVERY 6 HOURS
Qty: 15 TABLET | Refills: 0 | Status: SHIPPED | OUTPATIENT
Start: 2022-07-08

## 2022-07-08 RX ADMIN — ALUMINUM HYDROXIDE AND MAGNESIUM HYDROXIDE 40 ML: 200; 200 SUSPENSION ORAL at 20:34

## 2022-07-08 RX ADMIN — ONDANSETRON 4 MG: 2 INJECTION INTRAMUSCULAR; INTRAVENOUS at 18:33

## 2022-07-08 RX ADMIN — IOPAMIDOL 100 ML: 755 INJECTION, SOLUTION INTRAVENOUS at 19:43

## 2022-07-08 RX ADMIN — SODIUM CHLORIDE 1000 ML: 9 INJECTION, SOLUTION INTRAVENOUS at 18:33

## 2022-07-08 RX ADMIN — FENTANYL CITRATE 100 MCG: 50 INJECTION, SOLUTION INTRAMUSCULAR; INTRAVENOUS at 18:33

## 2022-07-08 RX ADMIN — PROCHLORPERAZINE EDISYLATE 10 MG: 5 INJECTION INTRAMUSCULAR; INTRAVENOUS at 21:13

## 2022-07-08 NOTE — ED PROVIDER NOTES
EMERGENCY DEPARTMENT HISTORY AND PHYSICAL EXAM      Date: 7/8/2022  Patient Name: Anayeli Elizalde. History of Presenting Illness     Chief Complaint   Patient presents with    Chest Pain     Patient bought in my EMS from the side of the road while the patient was driving home. He started having chest pressure, nausea and vomiting. Patient advised that he has been working outside all day, has no AC in his vehicle, ate some hotdogs (history of indigestion and gallbladder issues). EMS placed an IV in the [address] [place] [address] and administered 4mg of Zofran prior to arrival. Patient nauseated and hyperventilating in triage. HPI: Anayeli Elizalde., 42 y.o. male presenting to the ED with epigastric pain. Current symptom onset was today. Associated with multiple episodes of nonbloody nonbilious vomiting. Also describes large amount of diarrhea. He states that he has had the symptoms several times before and has had a comprehensive cardiology evaluation including stress testing. He states he has had a right upper quadrant ultrasound that was normal and has been referred to GI but has not seen them yet. There are no other complaints, changes, or physical findings at this time. PCP: Ross Marquez MD    No current facility-administered medications on file prior to encounter. Current Outpatient Medications on File Prior to Encounter   Medication Sig Dispense Refill    pantoprazole (PROTONIX) 40 mg tablet Take 1 Tablet by mouth daily. 90 Tablet 1    dicyclomine (BENTYL) 10 mg capsule Take 1 Capsule by mouth four (4) times daily. 120 Capsule 3    sertraline (ZOLOFT) 50 mg tablet Take 1 Tablet by mouth daily. 90 Tablet 1       Past History     Past Medical History:  No past medical history on file. Past Surgical History:  Past Surgical History:   Procedure Laterality Date    HX HERNIA REPAIR         Family History:  No family history on file.     Social History:  Social History     Tobacco Use    Smoking status: Former Smoker     Packs/day: 1.00     Years: 15.00     Pack years: 15.00     Types: Cigarettes     Quit date: 10/15/2019     Years since quittin.7    Smokeless tobacco: Former User     Types: [facility] date: 10/7/2021   Vaping Use    Vaping Use: Never used   Substance Use Topics    Alcohol use: Yes     Alcohol/week: 20.0 standard drinks     Types: 20 Cans of beer per week    Drug use: Yes     Frequency: 7.0 times per week     Types: Marijuana     Comment: daily       Allergies: Allergies   Allergen Reactions    Amoxicillin Unknown (comments)    Codeine Unknown (comments)         Review of Systems   Review of Systems   Constitutional: Negative for chills and fever. HENT: Negative for sore throat. Eyes: Negative for redness. Respiratory: Negative for shortness of breath. Cardiovascular: Negative for chest pain. Gastrointestinal: Positive for abdominal pain, diarrhea, nausea and vomiting. Genitourinary: Negative for dysuria. Musculoskeletal: Negative for back pain. Neurological: Negative for syncope. Psychiatric/Behavioral: The patient is not nervous/anxious. All other systems reviewed and are negative. Physical Exam   Physical Exam  Vitals and nursing note reviewed. Constitutional:       Appearance: Normal appearance. HENT:      Head: Normocephalic and atraumatic. Mouth/Throat:      Mouth: Mucous membranes are moist.   Cardiovascular:      Rate and Rhythm: Normal rate and regular rhythm. Pulmonary:      Effort: Pulmonary effort is normal.      Breath sounds: Normal breath sounds. Abdominal:      Palpations: Abdomen is soft. Tenderness: There is no abdominal tenderness. There is no guarding or rebound. Comments: Dry heaving during my evaluation   Musculoskeletal:         General: No deformity. Cervical back: Neck supple. Skin:     General: Skin is warm and dry. Neurological:      General: No focal deficit present.       Mental Status: He is alert. Psychiatric:         Mood and Affect: Mood normal.         Behavior: Behavior normal.         Diagnostic Study Results     Labs -     Recent Results (from the past 24 hour(s))   EKG, 12 LEAD, INITIAL    Collection Time: 07/08/22  4:39 PM   Result Value Ref Range    Ventricular Rate 52 BPM    Atrial Rate 52 BPM    P-R Interval 138 ms    QRS Duration 106 ms    Q-T Interval 480 ms    QTC Calculation (Bezet) 446 ms    Calculated P Axis 59 degrees    Calculated R Axis 68 degrees    Calculated T Axis 54 degrees    Diagnosis       Sinus bradycardia with sinus arrhythmia  When compared with ECG of 23-JUN-2015 12:40,  Vent. rate has decreased BY  31 BPM  T wave amplitude has increased in Lateral leads     CBC WITH AUTOMATED DIFF    Collection Time: 07/08/22  6:28 PM   Result Value Ref Range    WBC 14.2 (H) 4.1 - 11.1 K/uL    RBC 4.22 4.10 - 5.70 M/uL    HGB 13.0 12.1 - 17.0 g/dL    HCT 37.6 36.6 - 50.3 %    MCV 89.1 80.0 - 99.0 FL    MCH 30.8 26.0 - 34.0 PG    MCHC 34.6 30.0 - 36.5 g/dL    RDW 12.6 11.5 - 14.5 %    PLATELET 066 855 - 329 K/uL    MPV 9.9 8.9 - 12.9 FL    NRBC 0.0 0  WBC    ABSOLUTE NRBC 0.00 0.00 - 0.01 K/uL    NEUTROPHILS 88 (H) 32 - 75 %    LYMPHOCYTES 7 (L) 12 - 49 %    MONOCYTES 4 (L) 5 - 13 %    EOSINOPHILS 0 0 - 7 %    BASOPHILS 0 0 - 1 %    IMMATURE GRANULOCYTES 1 (H) 0.0 - 0.5 %    ABS. NEUTROPHILS 12.4 (H) 1.8 - 8.0 K/UL    ABS. LYMPHOCYTES 1.0 0.8 - 3.5 K/UL    ABS. MONOCYTES 0.6 0.0 - 1.0 K/UL    ABS. EOSINOPHILS 0.0 0.0 - 0.4 K/UL    ABS. BASOPHILS 0.1 0.0 - 0.1 K/UL    ABS. IMM.  GRANS. 0.1 (H) 0.00 - 0.04 K/UL    DF AUTOMATED     METABOLIC PANEL, COMPREHENSIVE    Collection Time: 07/08/22  6:28 PM   Result Value Ref Range    Sodium 141 136 - 145 mmol/L    Potassium 3.8 3.5 - 5.1 mmol/L    Chloride 110 (H) 97 - 108 mmol/L    CO2 24 21 - 32 mmol/L    Anion gap 7 5 - 15 mmol/L    Glucose 151 (H) 65 - 100 mg/dL    BUN 16 6 - 20 MG/DL    Creatinine 0.96 0.70 - 1.30 MG/DL BUN/Creatinine ratio 17 12 - 20      GFR est AA >60 >60 ml/min/1.73m2    GFR est non-AA >60 >60 ml/min/1.73m2    Calcium 9.6 8.5 - 10.1 MG/DL    Bilirubin, total 1.1 (H) 0.2 - 1.0 MG/DL    ALT (SGPT) 24 12 - 78 U/L    AST (SGOT) 23 15 - 37 U/L    Alk. phosphatase 53 45 - 117 U/L    Protein, total 7.6 6.4 - 8.2 g/dL    Albumin 4.2 3.5 - 5.0 g/dL    Globulin 3.4 2.0 - 4.0 g/dL    A-G Ratio 1.2 1.1 - 2.2     LIPASE    Collection Time: 07/08/22  6:28 PM   Result Value Ref Range    Lipase 110 73 - 393 U/L   TROPONIN-HIGH SENSITIVITY    Collection Time: 07/08/22  7:10 PM   Result Value Ref Range    Troponin-High Sensitivity 4 0 - 76 ng/L       Radiologic Studies -   CT ABD PELV W CONT   Final Result   1. Mild wall thickening of the transverse and descending colon, possibly due to   underdistention. Inflammatory or infectious colitis considered. 2.  Other findings as above. US ABD LTD   Final Result   No gallstones or biliary ductal dilation or other pathology of the   right upper quadrant demonstrated. CT Results  (Last 48 hours)               07/08/22 1944  CT ABD PELV W CONT Final result    Impression:  1. Mild wall thickening of the transverse and descending colon, possibly due to   underdistention. Inflammatory or infectious colitis considered. 2.  Other findings as above. Narrative:  EXAM: CT ABD PELV W CONT       INDICATION: abd pain, vomiting        COMPARISON: None        CONTRAST: 100 mL of Isovue-370. ORAL CONTRAST: None       TECHNIQUE:    Following the uneventful intravenous administration of contrast, thin axial   images were obtained through the abdomen and pelvis. Coronal and sagittal   reconstructions were generated. CT dose reduction was achieved through use of a   standardized protocol tailored for this examination and automatic exposure   control for dose modulation.        FINDINGS:    LOWER THORAX: No significant abnormality in the incidentally imaged lower chest. LIVER: No mass. BILIARY TREE: Gallbladder is within normal limits. CBD is not dilated. SPLEEN: within normal limits. PANCREAS: No mass or ductal dilatation. ADRENALS: Unremarkable. KIDNEYS: No mass, calculus, or hydronephrosis. STOMACH: Unremarkable. SMALL BOWEL: Mild wall thickening of the transverse and descending colon. No   obstruction. COLON: No dilatation or wall thickening. APPENDIX: Unremarkable. PERITONEUM: No ascites or pneumoperitoneum. RETROPERITONEUM: No lymphadenopathy or aortic aneurysm. REPRODUCTIVE ORGANS: Prostate and seminal vesicles are unremarkable. URINARY BLADDER: No mass or calculus. BONES: No destructive bone lesion. ABDOMINAL WALL: No mass or hernia. ADDITIONAL COMMENTS: N/A               CXR Results  (Last 48 hours)    None            Medical Decision Making   I am the first provider for this patient. I reviewed the vital signs, available nursing notes, past medical history, past surgical history, family history and social history. Vital Signs-Reviewed the patient's vital signs. Patient Vitals for the past 24 hrs:   Temp Pulse Resp BP SpO2   07/08/22 2036 -- 63 17 -- 100 %   07/08/22 2021 -- (!) 56 13 -- 100 %   07/08/22 1906 -- 62 21 -- 99 %   07/08/22 1633 97.8 °F (36.6 °C) 65 20 137/78 100 %         Provider Notes (Medical Decision Making):   Pleasant 68-year-old with history of gastrointestinal symptoms presenting to ED with chief complaint of nausea, vomiting, diarrhea and epigastric pain. He was noted to vomit on arrival but seem to feel a bit better after Zofran. He was given a GI cocktail and stated that he gave him some relief though he continues to feel nauseous and burp. His EKG is no ischemia, troponin is negative, low suspicion for cardiac etiology. CT scan demonstrates possible colitis. Otherwise, labs are reassuring. We will plan for referral to   Neurology for further management.   He will be discharged with symptomatic medications. He was encouraged to return to ED with worsening of condition or new concerning symptoms. EKG sinus, rate normal, nonspecific S TT changes, normal QT    ED Course:     Initial assessment performed. The patients presenting problems have been discussed, and they are in agreement with the care plan formulated and outlined with them. I have encouraged them to ask questions as they arise throughout their visit. Disposition:  dc    PLAN:  1. Current Discharge Medication List      START taking these medications    Details   ondansetron hcl (Zofran) 4 mg tablet Take 1 Tablet by mouth every eight (8) hours as needed for Nausea or Vomiting. Qty: 15 Tablet, Refills: 0  Start date: 7/8/2022      !! pantoprazole (Protonix) 40 mg tablet Take 1 Tablet by mouth daily for 20 days. Qty: 20 Tablet, Refills: 0  Start date: 7/8/2022, End date: 7/28/2022      dicyclomine (BENTYL) 20 mg tablet Take 1 Tablet by mouth every six (6) hours. Qty: 15 Tablet, Refills: 0  Start date: 7/8/2022       !! - Potential duplicate medications found. Please discuss with provider. CONTINUE these medications which have NOT CHANGED    Details   ! ! pantoprazole (PROTONIX) 40 mg tablet Take 1 Tablet by mouth daily. Qty: 90 Tablet, Refills: 1      dicyclomine (BENTYL) 10 mg capsule Take 1 Capsule by mouth four (4) times daily. Qty: 120 Capsule, Refills: 3    Associated Diagnoses: Irritable bowel syndrome with diarrhea       !! - Potential duplicate medications found. Please discuss with provider.         2.   Follow-up Information     Follow up With Specialties Details Why Celso Stafford MD Gastroenterology   [address] [place] [address] [address] [phone]          Return to ED if worse     Diagnosis     Clinical Impression: colitis

## 2022-07-09 NOTE — ED NOTES
Pt discharged to home, in nad, rx x 3 sent, feeling better pain 3/10, nausea mostly resolved, states understanding of dc instructions and followup.

## 2022-07-10 LAB
ATRIAL RATE: 52 BPM
CALCULATED P AXIS, ECG09: 59 DEGREES
CALCULATED R AXIS, ECG10: 68 DEGREES
CALCULATED T AXIS, ECG11: 54 DEGREES
DIAGNOSIS, 93000: NORMAL
P-R INTERVAL, ECG05: 138 MS
Q-T INTERVAL, ECG07: 480 MS
QRS DURATION, ECG06: 106 MS
QTC CALCULATION (BEZET), ECG08: 446 MS
VENTRICULAR RATE, ECG03: 52 BPM

## 2022-08-08 ENCOUNTER — TELEPHONE (OUTPATIENT)
Dept: FAMILY MEDICINE CLINIC | Age: 37
End: 2022-08-08

## 2022-08-08 NOTE — TELEPHONE ENCOUNTER
----- Message from Tila Long sent at 8/8/2022  1:51 PM EDT -----  Subject: Message to Provider    QUESTIONS  Information for Provider? Patient called because he has colitis and he has   been experiencing diarrhea and vomiting the last 3 days. I did make him an   appointment with Victorino Chen for tomorrow morning, but he stated he   would still like to talk to Dr. Marta Flores about what he has going on and his   medications and such.   ---------------------------------------------------------------------------  --------------  [address]  [phone]; OK to leave message on voicemail  ---------------------------------------------------------------------------  --------------  SCRIPT ANSWERS  Relationship to Patient?  Self

## 2022-08-08 NOTE — TELEPHONE ENCOUNTER
Returned his call. 3 days of predominantly nighttime diarrhea vomiting. He believes that this is a flare of his \"colitis\" these are his usual symptoms for him. He tried Zofran he had leftover from an old prescription and did not find it very helpful so he did not take it more than once. He finds that if he drinks fluids too quickly he will vomit. Encouraged slow steady fluid intake. Do not need to eat if you are not hungry, stick with the fluids    Continue your typical medication if you can  Try Pepto-Bismol    Encouraged keeping the appointment in the morning.   This is unusual symptoms for him and worth taking a look at

## 2022-08-11 ENCOUNTER — TELEPHONE (OUTPATIENT)
Dept: FAMILY MEDICINE CLINIC | Age: 37
End: 2022-08-11

## 2022-08-11 DIAGNOSIS — K58.0 IRRITABLE BOWEL SYNDROME WITH DIARRHEA: Primary | ICD-10-CM

## 2022-08-11 NOTE — TELEPHONE ENCOUNTER
Pt is calling in regards to a referral to another gastrologist; Pt states he is able to get in sooner with them; please call pt back     [facility]   [phone]  Fax# [phone]    Raúl    [address]      Thank Jose Man

## 2022-08-17 ENCOUNTER — TELEPHONE (OUTPATIENT)
Dept: FAMILY MEDICINE CLINIC | Age: 37
End: 2022-08-17

## 2022-08-17 NOTE — TELEPHONE ENCOUNTER
verified. Called patient in regards to the [facility] not receiving the referral and that it has been faxed again on 22. Patient verified understanding but stated that is has already been taken care of.

## 2022-08-17 NOTE — TELEPHONE ENCOUNTER
----- Message from [facility] sent at 8/17/2022  2:52 PM EDT -----  Subject: Message to Provider    QUESTIONS  Information for Provider? patient is calling in regards of Referral to   [facility] [phone] Fax# [phone] Raúl Siddiqui   [place], [place] states that they have not rec'd referral yet can   you please call him back   ---------------------------------------------------------------------------  --------------  [address]  [phone]; OK to leave message on voicemail  ---------------------------------------------------------------------------  --------------  SCRIPT ANSWERS  Relationship to Patient?  Self